# Patient Record
Sex: MALE | Race: ASIAN
[De-identification: names, ages, dates, MRNs, and addresses within clinical notes are randomized per-mention and may not be internally consistent; named-entity substitution may affect disease eponyms.]

---

## 2018-04-14 ENCOUNTER — HOSPITAL ENCOUNTER (EMERGENCY)
Dept: HOSPITAL 26 - MED | Age: 68
Discharge: HOME | End: 2018-04-14
Payer: COMMERCIAL

## 2018-04-14 VITALS — SYSTOLIC BLOOD PRESSURE: 150 MMHG | DIASTOLIC BLOOD PRESSURE: 88 MMHG

## 2018-04-14 VITALS — DIASTOLIC BLOOD PRESSURE: 65 MMHG | SYSTOLIC BLOOD PRESSURE: 112 MMHG

## 2018-04-14 VITALS — HEIGHT: 67 IN | BODY MASS INDEX: 21.19 KG/M2 | WEIGHT: 135 LBS

## 2018-04-14 DIAGNOSIS — F45.8: Primary | ICD-10-CM

## 2018-04-14 DIAGNOSIS — R03.0: ICD-10-CM

## 2018-04-14 LAB
ALBUMIN FLD-MCNC: 4.2 G/DL (ref 3.4–5)
ANION GAP SERPL CALCULATED.3IONS-SCNC: 20.7 MMOL/L (ref 8–16)
AST SERPL-CCNC: 39 U/L (ref 15–37)
BASOPHILS # BLD AUTO: 0 K/UL (ref 0–0.22)
BASOPHILS NFR BLD AUTO: 0.5 % (ref 0–2)
BILIRUB SERPL-MCNC: 1.1 MG/DL (ref 0–1)
BUN SERPL-MCNC: 28 MG/DL (ref 7–18)
CHLORIDE SERPL-SCNC: 100 MMOL/L (ref 98–107)
CO2 SERPL-SCNC: 19.6 MMOL/L (ref 21–32)
CREAT SERPL-MCNC: 1.1 MG/DL (ref 0.7–1.3)
EOSINOPHIL # BLD AUTO: 0.1 K/UL (ref 0–0.4)
EOSINOPHIL NFR BLD AUTO: 1 % (ref 0–4)
ERYTHROCYTE [DISTWIDTH] IN BLOOD BY AUTOMATED COUNT: 13.4 % (ref 11.6–13.7)
GFR SERPL CREATININE-BSD FRML MDRD: 86 ML/MIN (ref 90–?)
GLUCOSE SERPL-MCNC: 210 MG/DL (ref 74–106)
HCT VFR BLD AUTO: 47.5 % (ref 36–52)
HGB BLD-MCNC: 16 G/DL (ref 12–18)
LYMPHOCYTES # BLD AUTO: 3 K/UL (ref 2–11.5)
LYMPHOCYTES NFR BLD AUTO: 37.6 % (ref 20.5–51.1)
MCH RBC QN AUTO: 30 PG (ref 27–31)
MCHC RBC AUTO-ENTMCNC: 34 G/DL (ref 33–37)
MCV RBC AUTO: 89.7 FL (ref 80–94)
MONOCYTES # BLD AUTO: 0.7 K/UL (ref 0.8–1)
MONOCYTES NFR BLD AUTO: 8.3 % (ref 1.7–9.3)
NEUTROPHILS # BLD AUTO: 4.1 K/UL (ref 1.8–7.7)
NEUTROPHILS NFR BLD AUTO: 52.6 % (ref 42.2–75.2)
PLATELET # BLD AUTO: 191 K/UL (ref 140–450)
POTASSIUM SERPL-SCNC: 3.3 MMOL/L (ref 3.5–5.1)
PROTHROMBIN TIME: 10.4 SECS (ref 10.8–13.4)
RBC # BLD AUTO: 5.29 MIL/UL (ref 4.2–6.1)
SODIUM SERPL-SCNC: 137 MMOL/L (ref 136–145)
WBC # BLD AUTO: 7.9 K/UL (ref 4.8–10.8)

## 2018-04-14 PROCEDURE — 36415 COLL VENOUS BLD VENIPUNCTURE: CPT

## 2018-04-14 PROCEDURE — 85025 COMPLETE CBC W/AUTO DIFF WBC: CPT

## 2018-04-14 PROCEDURE — 71045 X-RAY EXAM CHEST 1 VIEW: CPT

## 2018-04-14 PROCEDURE — 93005 ELECTROCARDIOGRAM TRACING: CPT

## 2018-04-14 PROCEDURE — 96361 HYDRATE IV INFUSION ADD-ON: CPT

## 2018-04-14 PROCEDURE — 85610 PROTHROMBIN TIME: CPT

## 2018-04-14 PROCEDURE — 84484 ASSAY OF TROPONIN QUANT: CPT

## 2018-04-14 PROCEDURE — 80053 COMPREHEN METABOLIC PANEL: CPT

## 2018-04-14 PROCEDURE — 85730 THROMBOPLASTIN TIME PARTIAL: CPT

## 2018-04-14 PROCEDURE — 96360 HYDRATION IV INFUSION INIT: CPT

## 2018-04-14 PROCEDURE — 99285 EMERGENCY DEPT VISIT HI MDM: CPT

## 2018-04-14 NOTE — NUR
PT LYING DOWN CALMLY RR AND HR WNL, COMMUNICATING EFFECTIVLY NO HURRIED SPEECH, 
DENIES SOB AN DCHEST PAIN AT THIS TIME.

## 2018-04-14 NOTE — NUR
PT CONTINUES TO REST QUIETLY AT BEDSIDE, DENIES ANY PAIN OR SOB. HE SPOKE ON 
THE PHONE TO HIS WIFE. IV FLUIDS STILL INFUSING NO S/S OF IV INFILTRATE.

## 2018-04-14 NOTE — NUR
PT AMBULATED TO BED 10 WITH ASSISTANCE. PT NOTED TACHYPENIA. SKIN PALE WARM AND 
DRY. PT DENIES CHEST PAIN ONLY COMPLAING OF HIS HEART BEATING RAPIDLY.PT PLACED 
ON MONITOR AND  RR  45 . APPLIED NONREBREATHER WITH OUT O2 AND RECLINED IN BED. 
HEART RATE AT THIS IS TACHY . MD AT BEDSIDE EKG IN PROGRESS.